# Patient Record
Sex: MALE | Race: BLACK OR AFRICAN AMERICAN | Employment: FULL TIME | URBAN - METROPOLITAN AREA
[De-identification: names, ages, dates, MRNs, and addresses within clinical notes are randomized per-mention and may not be internally consistent; named-entity substitution may affect disease eponyms.]

---

## 2018-04-23 ENCOUNTER — HOSPITAL ENCOUNTER (OUTPATIENT)
Dept: DIABETES SERVICES | Age: 58
Discharge: HOME | End: 2018-04-23
Payer: COMMERCIAL

## 2018-04-23 VITALS — BODY MASS INDEX: 30.77 KG/M2 | WEIGHT: 203 LBS | HEIGHT: 68 IN

## 2018-04-23 PROCEDURE — G0108 DIAB MANAGE TRN  PER INDIV: HCPCS

## 2018-05-08 ENCOUNTER — HOSPITAL ENCOUNTER (OUTPATIENT)
Dept: DIABETES SERVICES | Age: 58
Discharge: HOME | End: 2018-05-08
Payer: COMMERCIAL

## 2018-05-08 PROCEDURE — G0109 DIAB MANAGE TRN IND/GROUP: HCPCS

## 2018-05-08 NOTE — PROGRESS NOTES
Attended nutrition diabetes #1 group session today. Topics included: disease process and treatment; carbohydrate choices (emphasizing high fiber carbohydrates); proteins (emphasizing heart healthy choices) and fat food choices (emphasizing unsaturated fats); free foods; combination food choices; nutrition tips for persons with diabetes; snack ideas; resources for diabetes management. Two methods of meal planning were reviewed: carbohydrate choices and carbohydrate counting. Basics of exercise discussed. Voiced /demonstrated understanding of material covered. Goal for next session is to add a protein to meals and snacks. Anticipated adherence is good. Problems/barriers may be: none identified at this time.

## 2018-05-10 ENCOUNTER — HOSPITAL ENCOUNTER (OUTPATIENT)
Dept: DIABETES SERVICES | Age: 58
Discharge: HOME | End: 2018-05-10
Payer: COMMERCIAL

## 2018-05-10 PROCEDURE — G0109 DIAB MANAGE TRN IND/GROUP: HCPCS

## 2018-05-10 NOTE — PROGRESS NOTES
Participant attended Diabetes #1 session today. Topics included: Characteristics/pathophysiology type 1/type 2 diabetes; Goal/acceptable blood glucose ranges/Hgb A1C/interpreting/using results; Using medications safely; Sick day management; Prevention/detection/treatment of acute complications. - Verbalized understanding  of material covered.   -Goal for next session Nutrition 2   -Anticipated adherence is   Good   -Problems/barriers may be none anticipated

## 2018-05-16 ENCOUNTER — TELEPHONE (OUTPATIENT)
Dept: DIABETES SERVICES | Age: 58
End: 2018-05-16

## 2018-05-16 NOTE — TELEPHONE ENCOUNTER
Patient was a no show for Diabetes Two class today. He called and had canceled his Nutrition Two class due to unexpected appointment. He reports he forgot about the class today. He reports when he gets his schedule he will call back and schedule now both appointments. He still needs Nutrition 2 and Diabetes 2 classes.

## 2018-06-19 ENCOUNTER — TELEPHONE (OUTPATIENT)
Dept: DIABETES SERVICES | Age: 58
End: 2018-06-19

## 2018-06-28 ENCOUNTER — HOSPITAL ENCOUNTER (OUTPATIENT)
Dept: DIABETES SERVICES | Age: 58
Discharge: HOME | End: 2018-06-28
Payer: COMMERCIAL

## 2018-06-28 PROCEDURE — G0109 DIAB MANAGE TRN IND/GROUP: HCPCS

## 2018-06-28 NOTE — PROGRESS NOTES
Attended nutrition diabetes #2 group session today. Topics included: plate method for portion control; fiber and sodium guidelines; sugar substitutes; alcohol; eating out; recipe modification; label reading. Voiced/demonstrated understanding of material covered. Anticipated adherence is good. Pt reports a 10 lb wt loss since starting education classes. Pt's nutrition goal is to limit carbohydrates between 45-60 grams and exercise one hour daily to lower blood sugars and blood pressure and re-evaluate in one month. Problems/barriers: none identified at this time. Recommend check pre-meal blood glucose and 2 hour post-prandial blood glucose to see how food choices affect blood glucose. Plan for follow up is attend diabetes medical #2 class on 7/23/18.

## 2018-07-23 ENCOUNTER — HOSPITAL ENCOUNTER (OUTPATIENT)
Dept: DIABETES SERVICES | Age: 58
Discharge: HOME | End: 2018-07-23
Payer: COMMERCIAL

## 2018-07-23 PROCEDURE — G0109 DIAB MANAGE TRN IND/GROUP: HCPCS

## 2018-07-24 ENCOUNTER — HOSPITAL ENCOUNTER (OUTPATIENT)
Dept: DIABETES SERVICES | Age: 58
Discharge: HOME | End: 2018-07-24
Payer: COMMERCIAL

## 2018-07-24 PROCEDURE — G0109 DIAB MANAGE TRN IND/GROUP: HCPCS

## 2018-07-24 NOTE — PROGRESS NOTES
Attended diabetes follow up group class. Open forum for clients to ask questions based on entire diabetes self management education program. Education topics are driven in this class based on clients' individual questions and needs. Topics covered included: blood pressure,signs/symptoms of high/low blood sugars, complications, medications, Hgb A1C, exercise, sugar substitutes, meal planning, carbohydrates, proteins, fiber and cholesterol.

## 2022-12-20 DIAGNOSIS — C61 MALIGNANT NEOPLASM OF PROSTATE (HCC): ICD-10-CM

## 2022-12-20 DIAGNOSIS — C61 MALIGNANT NEOPLASM OF PROSTATE (HCC): Primary | ICD-10-CM

## 2022-12-22 LAB — PSA SERPL DL<=0.01 NG/ML-MCNC: 0.01 NG/ML (ref 0–4)

## 2023-01-24 ENCOUNTER — OFFICE VISIT (OUTPATIENT)
Dept: UROLOGY | Age: 63
End: 2023-01-24
Payer: COMMERCIAL

## 2023-01-24 DIAGNOSIS — N52.9 ERECTILE DYSFUNCTION, UNSPECIFIED ERECTILE DYSFUNCTION TYPE: ICD-10-CM

## 2023-01-24 DIAGNOSIS — C61 MALIGNANT NEOPLASM OF PROSTATE (HCC): Primary | ICD-10-CM

## 2023-01-24 LAB
BILIRUBIN, URINE, POC: NEGATIVE
BLOOD URINE, POC: NEGATIVE
GLUCOSE URINE, POC: NEGATIVE
KETONES, URINE, POC: NEGATIVE
LEUKOCYTE ESTERASE, URINE, POC: NEGATIVE
NITRITE, URINE, POC: NEGATIVE
PH, URINE, POC: 6 (ref 4.6–8)
PROTEIN,URINE, POC: NEGATIVE
SPECIFIC GRAVITY, URINE, POC: 1.02 (ref 1–1.03)
URINALYSIS CLARITY, POC: NORMAL
URINALYSIS COLOR, POC: NORMAL
UROBILINOGEN, POC: NORMAL

## 2023-01-24 PROCEDURE — 99214 OFFICE O/P EST MOD 30 MIN: CPT | Performed by: UROLOGY

## 2023-01-24 PROCEDURE — 81003 URINALYSIS AUTO W/O SCOPE: CPT | Performed by: UROLOGY

## 2023-01-24 ASSESSMENT — ENCOUNTER SYMPTOMS: NAUSEA: 0

## 2023-01-24 NOTE — PROGRESS NOTES
[facility]  [address]    Adebayo Goldlander [address], [address] [facility]  [phone]          Nirmal Riley  : 1960    Chief Complaint   Patient presents with    Follow-up          HPI     Nirmal Riley is a 60 y.o. male ([place], from [facility]) followed secondary to prostate cancer. He knows no family history as his parents did not see physicians in [facility]. He had no LUTS. He denied gross hematuria or symptoms of prostatitis. He had no previous abdominal surgeries. Prostate biopsy 12/17/15 revealed ACP in 7 cores, 6 cores were [place] 6 and 1 was [place] 3+4=7. Cores were + on L and R.  Gland was 32 grams. He underwent RALP 2/15/16. Pathology revealed [place] 3+4=7 ACP, B, negative margins and SV's, pT2c. He is continent and does not use a pad. ED doesn't seem to be responding to trimix as it once did. He uses super super trimix with mixed results. PSA: 6/15 4.17, 9/15 5.18, 16 <0.015,  <0.006,  <0.006,  <0.006,  <0.014,  0.007, 22 0.006          Past Medical History:   Diagnosis Date    Anxiety     Arthralgia of right shoulder region     Benign hypertension     Cancer (HCC)     Diabetes (HCC)     DJD (degenerative joint disease)     Dyslipidemia     PHIL (generalized anxiety disorder)     GERD (gastroesophageal reflux disease)     Hypertension     Personal history of prostate cancer      Past Surgical History:   Procedure Laterality Date    BACK SURGERY      Cervical replaced, C7 at [facility]  3/15/2010    Multiple small polyps; predominant right-sided diverticulosis.     KNEE ARTHROSCOPY      OTHER SURGICAL HISTORY       left ear,    PROSTATECTOMY      Robotic     Current Outpatient Medications   Medication Sig Dispense Refill    Papav-Phentolamine-Alprostadil (SUPER TRI-MIX) 150- MG-MG-MCG SOLR 1 each by IntraCAVernosal route as needed (erectile dysfunction) PGE1: 40 mcg/ml; Papaverine: 30 mg/ml, Phentolamine: 3 mg/ml;  + all other supplies as needed. 1 each 5    amLODIPine (NORVASC) 5 MG tablet Take 5 mg by mouth daily      clonazePAM (KLONOPIN) 0.5 MG tablet Take 0.5 mg by mouth 2 times daily. metFORMIN (GLUCOPHAGE) 1000 MG tablet Take 1,000 mg by mouth 2 times daily (with meals)      omeprazole (PRILOSEC) 40 MG delayed release capsule Take 40 mg by mouth      valsartan-hydroCHLOROthiazide (DIOVAN-HCT) 320-25 MG per tablet Take 1 tablet by mouth       No current facility-administered medications for this visit. No Known Allergies  Social History     Socioeconomic History    Marital status:      Spouse name: Not on file    Number of children: Not on file    Years of education: Not on file    Highest education level: Not on file   Occupational History    Not on file   Tobacco Use    Smoking status: Former     Types: Cigarettes     Quit date: 1987     Years since quittin.9    Smokeless tobacco: Never   Substance and Sexual Activity    Alcohol use: Yes    Drug use: Not on file    Sexual activity: Not on file   Other Topics Concern    Not on file   Social History Narrative    Not on file     Social Determinants of Health     Financial Resource Strain: Not on file   Food Insecurity: Not on file   Transportation Needs: Not on file   Physical Activity: Not on file   Stress: Not on file   Social Connections: Not on file   Intimate Partner Violence: Not on file   Housing Stability: Not on file     Family History   Problem Relation Age of Onset    Asthma Brother        Review of Systems  Constitutional:   Negative for fever. GI:  Negative for nausea. Genitourinary:  Negative for flank pain.     Urinalysis  UA - Dipstick  Results for orders placed or performed in visit on 23   AMB POC URINALYSIS DIP STICK AUTO W/O MICRO   Result Value Ref Range    Color (UA POC)      Clarity (UA POC)      Glucose, Urine, POC Negative Negative    Bilirubin, Urine, POC Negative Negative    KETONES, Urine, POC Negative Negative    Specific Gravity, Urine, POC 1.020 1.001 - 1.035    Blood (UA POC) Negative Negative    pH, Urine, POC 6.0 4.6 - 8.0    Protein, Urine, POC Negative Negative    Urobilinogen, POC 0.2 mg/dL     Nitrite, Urine, POC Negative Negative    Leukocyte Esterase, Urine, POC Negative Negative       There were no vitals taken for this visit. GENERAL: NAD, ALERT, A&O x 3, GAIT NORMAL  LUNGS: easy work of breathing  ABDOMEN: soft, non tender  NEUROLOGIC: cranial nerves 2-12 grossly intact           Assessment and Plan    ICD-10-CM    1. Malignant neoplasm of prostate (HCC)  C61 AMB POC URINALYSIS DIP STICK AUTO W/O MICRO     PSA, ultrasensitive      2. Erectile dysfunction, unspecified erectile dysfunction type  N52.9 Papav-Phentolamine-Alprostadil (SUPER TRI-MIX) 150- MG-MG-MCG SOLR          In regards to ACP, psa remains LOW. He will have it rechecked 1 year. In regards to ed, super super trimix was refilled.

## 2024-01-24 ENCOUNTER — NURSE ONLY (OUTPATIENT)
Dept: UROLOGY | Age: 64
End: 2024-01-24

## 2024-01-24 DIAGNOSIS — C61 MALIGNANT NEOPLASM OF PROSTATE (HCC): ICD-10-CM

## 2024-01-26 LAB — PSA SERPL DL<=0.01 NG/ML-MCNC: <0.006 NG/ML (ref 0–4)

## 2024-01-30 ENCOUNTER — OFFICE VISIT (OUTPATIENT)
Dept: UROLOGY | Age: 64
End: 2024-01-30
Payer: COMMERCIAL

## 2024-01-30 DIAGNOSIS — N52.9 ERECTILE DYSFUNCTION, UNSPECIFIED ERECTILE DYSFUNCTION TYPE: ICD-10-CM

## 2024-01-30 DIAGNOSIS — C61 MALIGNANT NEOPLASM OF PROSTATE (HCC): Primary | ICD-10-CM

## 2024-01-30 LAB
BILIRUBIN, URINE, POC: NEGATIVE
BLOOD URINE, POC: NEGATIVE
GLUCOSE URINE, POC: 1000
KETONES, URINE, POC: NEGATIVE
LEUKOCYTE ESTERASE, URINE, POC: NEGATIVE
NITRITE, URINE, POC: NEGATIVE
PH, URINE, POC: 6.5 (ref 4.6–8)
PROTEIN,URINE, POC: NEGATIVE
SPECIFIC GRAVITY, URINE, POC: 1.01 (ref 1–1.03)
URINALYSIS CLARITY, POC: NORMAL
URINALYSIS COLOR, POC: NORMAL
UROBILINOGEN, POC: NORMAL

## 2024-01-30 PROCEDURE — 81003 URINALYSIS AUTO W/O SCOPE: CPT | Performed by: UROLOGY

## 2024-01-30 PROCEDURE — 99214 OFFICE O/P EST MOD 30 MIN: CPT | Performed by: UROLOGY

## 2024-01-30 RX ORDER — DAPAGLIFLOZIN 5 MG/1
5 TABLET, FILM COATED ORAL DAILY
COMMUNITY

## 2024-01-30 ASSESSMENT — ENCOUNTER SYMPTOMS: BACK PAIN: 0

## 2024-01-30 NOTE — PROGRESS NOTES
[facility]  [address] [place] [address]  [phone]          Tanisha Perez  : 1960    Chief Complaint   Patient presents with    Follow-up     yearly          HPI     Tanisha Perez is a 63 y.o. male ([place], from [facility]) followed secondary to prostate cancer.  He knows no family history as his parents did not see physicians in [facility]. He had no LUTS. He denied gross hematuria or symptoms of prostatitis. He had no previous abdominal surgeries.        Prostate biopsy 12/17/15 revealed ACP in 7 cores, 6 cores were [place] 6 and 1 was [place] 3+4=7.  Cores were + on L and R.  Gland was 32 grams.  He underwent RALP 2/15/16.  Pathology revealed [place] 3+4=7 ACP, B, negative margins and SV's, pT2c.       He is continent and does not use a pad.       ED doesn't seem to be responding to trimix as it once did.  He uses super super trimix with mixed results.        PSA: 6/15 4.17, 9/15 5.18, 16 <0.015,  <0.006,  <0.006,  <0.006,  <0.014,  0.007, 22 0.006,  <0.006          Past Medical History:   Diagnosis Date    Anxiety     Arthralgia of right shoulder region     Benign hypertension     Cancer (HCC)     Diabetes (HCC)     DJD (degenerative joint disease)     Dyslipidemia     PHIL (generalized anxiety disorder)     GERD (gastroesophageal reflux disease)     Hypertension     Personal history of prostate cancer      Past Surgical History:   Procedure Laterality Date    BACK SURGERY      Cervical replaced, C7 at [facility]    BIOPSY PROSTATE      COLONOSCOPY  3/15/2010    Multiple small polyps; predominant right-sided diverticulosis.    KNEE ARTHROSCOPY      OTHER SURGICAL HISTORY       left ear,    PROSTATECTOMY      Robotic     Current Outpatient Medications   Medication Sig Dispense Refill    FARXIGA 5 MG tablet Take 1 tablet by mouth daily      Papav-Phentolamine-Alprostadil (SUPER TRI-MIX) 150- MG-MG-MCG SOLR 1 each

## 2025-01-21 ENCOUNTER — LAB (OUTPATIENT)
Dept: UROLOGY | Age: 65
End: 2025-01-21

## 2025-01-21 DIAGNOSIS — C61 MALIGNANT NEOPLASM OF PROSTATE (HCC): ICD-10-CM

## 2025-01-23 LAB — PSA SERPL DL<=0.01 NG/ML-MCNC: <0.006 NG/ML (ref 0–4)

## 2025-01-28 ENCOUNTER — OFFICE VISIT (OUTPATIENT)
Dept: UROLOGY | Age: 65
End: 2025-01-28
Payer: COMMERCIAL

## 2025-01-28 DIAGNOSIS — N52.9 ERECTILE DYSFUNCTION, UNSPECIFIED ERECTILE DYSFUNCTION TYPE: ICD-10-CM

## 2025-01-28 DIAGNOSIS — C61 MALIGNANT NEOPLASM OF PROSTATE (HCC): Primary | ICD-10-CM

## 2025-01-28 LAB
BILIRUBIN, URINE, POC: NEGATIVE
BLOOD URINE, POC: NEGATIVE
GLUCOSE URINE, POC: 500 MG/DL
KETONES, URINE, POC: NEGATIVE MG/DL
LEUKOCYTE ESTERASE, URINE, POC: NEGATIVE
NITRITE, URINE, POC: NEGATIVE
PH, URINE, POC: 5.5 (ref 4.6–8)
PROTEIN,URINE, POC: NEGATIVE MG/DL
SPECIFIC GRAVITY, URINE, POC: 1.01 (ref 1–1.03)
URINALYSIS CLARITY, POC: NORMAL
URINALYSIS COLOR, POC: NORMAL
UROBILINOGEN, POC: NORMAL MG/DL

## 2025-01-28 PROCEDURE — 81003 URINALYSIS AUTO W/O SCOPE: CPT | Performed by: UROLOGY

## 2025-01-28 PROCEDURE — 1123F ACP DISCUSS/DSCN MKR DOCD: CPT | Performed by: UROLOGY

## 2025-01-28 PROCEDURE — 99214 OFFICE O/P EST MOD 30 MIN: CPT | Performed by: UROLOGY

## 2025-01-28 ASSESSMENT — ENCOUNTER SYMPTOMS: BACK PAIN: 0

## 2025-01-28 NOTE — PROGRESS NOTES
[facility]  [address] [place] [id number]  [phone]          Tanisha Perez  : 1960    Chief Complaint   Patient presents with    Follow-up     yearly          HPI     Tanisha Perez is a 64 y.o. male ([place]e, from [facility]) followed secondary to prostate cancer.  He knows no family history as his parents did not see physicians in [facility]. He had no LUTS. He denied gross hematuria or symptoms of prostatitis. He had no previous abdominal surgeries.        Prostate biopsy 12/17/15 revealed ACP in 7 cores, 6 cores were [place] 6 and 1 was [place] 3+4=7.  Cores were + on L and R.  Gland was 32 grams.  He underwent RALP 2/15/16.  Pathology revealed [place] 3+4=7 ACP, B, negative margins and SV's, pT2c.       He is continent and does not use a pad.       ED doesn't seem to be responding to trimix as it once did.  He uses super super trimix with mixed results.        PSA: 6/15 4.17, 9/15 5.18, 16 <0.015,  <0.006,  <0.006,  <0.006,  <0.014,  0.007, 22 0.006,  <0.006,  <0.006                Past Medical History:   Diagnosis Date    Anxiety     Arthralgia of right shoulder region     Benign hypertension     Cancer (HCC)     Diabetes (HCC)     DJD (degenerative joint disease)     Dyslipidemia     PHIL (generalized anxiety disorder)     GERD (gastroesophageal reflux disease)     Hypertension     Personal history of prostate cancer      Past Surgical History:   Procedure Laterality Date    BACK SURGERY      Cervical replaced, C7 at [facility]    BIOPSY PROSTATE      COLONOSCOPY  3/15/2010    Multiple small polyps; predominant right-sided diverticulosis.    KNEE ARTHROSCOPY      OTHER SURGICAL HISTORY       left ear,    PROSTATECTOMY      Robotic     Current Outpatient Medications   Medication Sig Dispense Refill    FARXIGA 5 MG tablet Take 1 tablet by mouth daily      Papav-Phentolamine-Alprostadil (SUPER TRI-MIX) 150-